# Patient Record
Sex: FEMALE | Race: BLACK OR AFRICAN AMERICAN | NOT HISPANIC OR LATINO | URBAN - METROPOLITAN AREA
[De-identification: names, ages, dates, MRNs, and addresses within clinical notes are randomized per-mention and may not be internally consistent; named-entity substitution may affect disease eponyms.]

---

## 2018-12-26 ENCOUNTER — TELEPHONE (OUTPATIENT)
Dept: HEMATOLOGY/ONCOLOGY | Facility: CLINIC | Age: 23
End: 2018-12-26

## 2018-12-26 NOTE — TELEPHONE ENCOUNTER
"----- Message from Ben Morel RN sent at 12/26/2018 11:11 AM CST -----  Contact: Pt      ----- Message -----  From: Reji Paez  Sent: 12/26/2018  11:02 AM  To: , #    Scheduling an appt    Who called: Pt  Message: Calling in regards to being scheduled for her being diagnosed with "Sickle Cell". Pt stated she hasn't been referred [facility] and wanted to establish care here.   Contact information: [phone]  Additional Information: N/A   "

## 2018-12-27 ENCOUNTER — TELEPHONE (OUTPATIENT)
Dept: HEMATOLOGY/ONCOLOGY | Facility: CLINIC | Age: 23
End: 2018-12-27

## 2018-12-28 NOTE — PROGRESS NOTES
History:       Reason For Consultation:   Sickle cell anemia, transitioning care to adult specialist    Referring Provider:   Stacy Self  No address on file    Records Obtained: Records of the patients history including those obtained from the referring provider were reviewed and summarized in detail.    HPI:   Diamond Uribe presents for consultation of sickle cell anemia. Patient previously followed with Dr Price at [facility] and is transitioning care to adult medicine. She's been well managed on 500 mg Hydrea daily for about 4 years and folic acid. She hasn't had pain crisis. No shortness of air or chest pain, but she's been told before that her heart is enlarged. No vision changes but she hasn't had a recent retinal exam. She is currently engaged; doesn't use form of birth control at this time.       Past Medical   Past Medical History:   Diagnosis Date    Sickle cell anemia      Patient Active Problem List   Diagnosis    Hb-SS disease without crisis    Enlarged heart     Social History   Social History     Socioeconomic History    Marital status: Single     Spouse name: Not on file    Number of children: Not on file    Years of education: Not on file    Highest education level: Not on file   Social Needs    Financial resource strain: Not on file    Food insecurity - worry: Not on file    Food insecurity - inability: Not on file    Transportation needs - medical: Not on file    Transportation needs - non-medical: Not on file   Occupational History    Not on file   Tobacco Use    Smoking status: Never Smoker    Smokeless tobacco: Never Used   Substance and Sexual Activity    Alcohol use: Yes     Frequency: Monthly or less     Drinks per session: 1 or 2    Drug use: Not on file    Sexual activity: Not on file   Other Topics Concern    Not on file   Social History Narrative    Not on file     Family History  Family History   Problem Relation Age of Onset    Sickle cell trait  Mother     Hypothyroidism Mother     Sickle cell trait Father     Diabetes Father     Sickle cell trait Sister     Sickle cell trait Brother      Medications    Current Outpatient Medications:     hydroxyurea (HYDREA) 500 mg Cap, Take 1 capsule (500 mg total) by mouth once daily., Disp: 90 capsule, Rfl: 2    folic acid (FOLVITE) 1 MG tablet, TK 1 T PO QD, Disp: , Rfl: 0  Allergies  Review of patient's allergies indicates: Review of patient's allergies indicates:  Allergies not on file    Review of Systems  Review of Systems   Constitutional: Negative for activity change, appetite change, chills, fatigue, fever and unexpected weight change.   HENT: Negative for congestion, hearing loss, nosebleeds, sinus pressure and trouble swallowing.    Eyes: Negative for pain, discharge and itching.   Respiratory: Negative for cough, chest tightness and shortness of breath.    Cardiovascular: Negative for chest pain, palpitations and leg swelling.   Gastrointestinal: Negative for abdominal distention, abdominal pain, blood in stool, diarrhea, nausea, rectal pain and vomiting.   Endocrine: Negative for heat intolerance and polydipsia.   Genitourinary: Negative for difficulty urinating, dysuria, flank pain, frequency, hematuria and urgency.   Musculoskeletal: Negative for arthralgias, back pain and neck pain.   Skin: Negative for color change, pallor and rash.   Neurological: Negative for dizziness, numbness and headaches.   Hematological: Negative for adenopathy. Does not bruise/bleed easily.   Psychiatric/Behavioral: Negative for confusion and decreased concentration. The patient is not nervous/anxious.         Objective     Objective:     Vitals:    12/31/18 1103   BP: 116/73   Pulse: 84   Resp: 16   Temp: 98.4 °F (36.9 °C)       GENERAL:  Well-developed, well-nourished female in no acute distress. Vital signs reviewed. Accompanied by fiance.   SKIN:  Warm, dry without rashes, purpura or petechiae.  EYES:  Pupils equal,  round and reactive to light.  EOMs intact.  Conjunctivae normal.  HEAD:  Normocephalic.  EARS/NOSE/MOUTH/THROAT:  Hearing intact. Septum midline.  No excoriations or nasal discharge. No stomatitis or ulcers.  Lips normal. Oropharynx without lesions or exudates.  NECK:  Supple with good range of motion; no thyromegaly or masses  LYMPHATICS:  No cervical, supraclavicular, axillary adenopathy.  RESP:  Lungs clear to percussion and auscultation. Good airflow. Normal respiratory effort.   CARDIAC:  Regular rate and rhythm without murmurs, rubs or gallops. Normal S1,S2. No edema  GI:  Soft, nontender, no hepatosplenomegaly, normal bowel sounds  MSK:  No clubbing or cyanosis, No joint swelling noted in hands  NEUROLOGICAL:  Cranial Nerves II-XII grossly intact.  No focal neurological deficits.  PSYCHIATRIC:  Normal affect and mood.  Alert and Oriented x 3.       Labs and Imaging  Results for orders placed or performed in visit on 01/23/17   Throat Screen, Rapid   Result Value Ref Range    Rapid Strep A Screen POSITIVE    CBC auto differential   Result Value Ref Range    WBC 14.4 (H) 4.8 - 10.8 K/uL    RBC 2.01 (L) 4.20 - 5.40 M/uL    Hemoglobin 8.2 (L) 12.0 - 16.0 gm/dL    Hematocrit 23.0 (L) 37.0 - 47.0 %    .1 (H) 80.0 - 100.0 fL    MCH 40.8 (H) 27.0 - 31.0 pg    MCHC 35.7 32.0 - 36.0 gm/dL    RDW 21.2 (H) 11.5 - 14.5 %    Platelets 163 130 - 450 K/uL    MPV 8.3 7.4 - 10.4 fL    Gran% 87.2 (H) 39.0 - 75.0 %    Lymph% 7.7 (L) 24.0 - 44.0 %    Mono% 4.4 0.0 - 10.0 %    Eosinophil% 0.3 0.0 - 12.0 %    Basophil% 0.4 0.0 - 2.0 %    Gran # (ANC) 12.6 (H) 1.5 - 6.6 K/uL    Lymph # 1.1 1.0 - 3.5 K/uL    Mono # 0.6 0.0 - 1.0 K/uL    Eos # 0.0 0.0 - 0.7 K/uL    Baso # 0.1 0.0 - 0.1 K/uL   Comprehensive metabolic panel   Result Value Ref Range    Calcium 8.4 8.4 - 10.2 MG/DL    Glucose 94 74 - 106 MG/DL    BUN, Bld 4 (L) 7.0 - 17.0 MG/DL    Creatinine 0.6 (L) 0.7 - 1.2 MG/DL    eGFR if non African American >60 >60  ml/min/1.73sq.m    eGFR if African American >60 >60 ml/min/1.73sq.m    Total Protein 8.4 (H) 6.3 - 8.2 G/DL    Albumin 4.3 3.5 - 5.0 G/DL    Total Bilirubin 5.7 (H) 0.2 - 1.3 MG/DL    Alkaline Phosphatase 61 38 - 126 U/L    AST 64 (H) 14.0 - 36.0 U/L    ALT 36 9 - 52 U/L    Sodium 139 137 - 145 MMOL/L    Potassium 3.4 (L) 3.5 - 5.1 MMOL/L    Chloride 105 98 - 107 MMOL/L    CO2 23.0 22.0 - 30.0 MMOL/L   Pregnancy, urine rapid   Result Value Ref Range    Preg Test, Ur NEGATIVE    Influenza antigen   Result Value Ref Range    Influenza A NEGATIVE NEG    Influenza B NEGATIVE NEG   Lactic acid, plasma   Result Value Ref Range    Lactate 1.2 0.7 - 2.1 MMOL/L     Labs ordered for today    Assessment   Assessment:     Problem List Items Addressed This Visit        Cardiac/Vascular    Enlarged heart      - Per patient report from her pediatric hematologist.    - Records requested   - Check 2D echo         Relevant Orders    2D Echo Only       Oncology    Hb-SS disease without crisis - Primary      * Well controlled with Hydrea 500 mg daily and folic acid. Continue.    * Annual influenza vaccine - due    * Annual optometry exam - due   * Discussed need for birth control while on Hydrea. Referral to gynecology to discuss             Relevant Medications    hydroxyurea (HYDREA) 500 mg Cap    Other Relevant Orders    Ambulatory consult to Optometry    CBC auto differential    Comprehensive metabolic panel    Hemoglobin Electrophoresis Cascade, Blood    2D Echo Only    CBC auto differential    Comprehensive metabolic panel    Ambulatory referral to Gynecologic Oncology              Plan:     1. Continue Hydrea 500 mg daily and folic acid  2. Referral to gyn and optometry  3. Influenza vaccine  4. Labs today and again in 6 months when she returns.     Follow-up in 6 months. I asked the patient to call for any questions, concerns, or new symptoms.         Distress Screening Results: Psychosocial Distress screening score of Distress  Score: 3 noted and reviewed. No intervention indicated.     ADDENDUM 01/30/2019   Reviewed outside records. Prior hemoglobin electrophoresis showed homozygous sickle cell disease with elevated fetal hemoglobin with 88% hemoglobin S and 8 % hemoglobin F. Appears hemoglobin often in 7- 8 range with retic % in mid-teens.

## 2018-12-31 ENCOUNTER — TELEPHONE (OUTPATIENT)
Dept: HEMATOLOGY/ONCOLOGY | Facility: CLINIC | Age: 23
End: 2018-12-31

## 2018-12-31 ENCOUNTER — LAB VISIT (OUTPATIENT)
Dept: LAB | Facility: HOSPITAL | Age: 23
End: 2018-12-31
Attending: INTERNAL MEDICINE
Payer: COMMERCIAL

## 2018-12-31 ENCOUNTER — INITIAL CONSULT (OUTPATIENT)
Dept: HEMATOLOGY/ONCOLOGY | Facility: CLINIC | Age: 23
End: 2018-12-31
Payer: COMMERCIAL

## 2018-12-31 VITALS
HEIGHT: 65 IN | TEMPERATURE: 98 F | HEART RATE: 84 BPM | RESPIRATION RATE: 16 BRPM | WEIGHT: 148.13 LBS | BODY MASS INDEX: 24.68 KG/M2 | DIASTOLIC BLOOD PRESSURE: 73 MMHG | SYSTOLIC BLOOD PRESSURE: 116 MMHG | OXYGEN SATURATION: 95 %

## 2018-12-31 DIAGNOSIS — D57.1 HB-SS DISEASE WITHOUT CRISIS: Primary | ICD-10-CM

## 2018-12-31 DIAGNOSIS — I51.7 ENLARGED HEART: ICD-10-CM

## 2018-12-31 DIAGNOSIS — D57.1 HB-SS DISEASE WITHOUT CRISIS: ICD-10-CM

## 2018-12-31 LAB
ALBUMIN SERPL BCP-MCNC: 4.1 G/DL
ALP SERPL-CCNC: 60 U/L
ALT SERPL W/O P-5'-P-CCNC: 23 U/L
ANION GAP SERPL CALC-SCNC: 8 MMOL/L
ANISOCYTOSIS BLD QL SMEAR: ABNORMAL
AST SERPL-CCNC: 51 U/L
BASOPHILS # BLD AUTO: 0.03 K/UL
BASOPHILS NFR BLD: 0.3 %
BILIRUB SERPL-MCNC: 3.6 MG/DL
BUN SERPL-MCNC: 6 MG/DL
CALCIUM SERPL-MCNC: 9.2 MG/DL
CHLORIDE SERPL-SCNC: 108 MMOL/L
CO2 SERPL-SCNC: 21 MMOL/L
CREAT SERPL-MCNC: 0.6 MG/DL
DIFFERENTIAL METHOD: ABNORMAL
EOSINOPHIL # BLD AUTO: 0.3 K/UL
EOSINOPHIL NFR BLD: 2.7 %
ERYTHROCYTE [DISTWIDTH] IN BLOOD BY AUTOMATED COUNT: 21.8 %
EST. GFR  (AFRICAN AMERICAN): >60 ML/MIN/1.73 M^2
EST. GFR  (NON AFRICAN AMERICAN): >60 ML/MIN/1.73 M^2
GLUCOSE SERPL-MCNC: 82 MG/DL
HCT VFR BLD AUTO: 20.9 %
HGB BLD-MCNC: 7.5 G/DL
HYPOCHROMIA BLD QL SMEAR: ABNORMAL
IMM GRANULOCYTES # BLD AUTO: 0.04 K/UL
IMM GRANULOCYTES NFR BLD AUTO: 0.4 %
LYMPHOCYTES # BLD AUTO: 3.5 K/UL
LYMPHOCYTES NFR BLD: 37.8 %
MCH RBC QN AUTO: 32.9 PG
MCHC RBC AUTO-ENTMCNC: 35.9 G/DL
MCV RBC AUTO: 92 FL
MONOCYTES # BLD AUTO: 1.3 K/UL
MONOCYTES NFR BLD: 14.3 %
NEUTROPHILS # BLD AUTO: 4.1 K/UL
NEUTROPHILS NFR BLD: 44.5 %
NRBC BLD-RTO: 2 /100 WBC
OVALOCYTES BLD QL SMEAR: ABNORMAL
PLATELET # BLD AUTO: 458 K/UL
PMV BLD AUTO: 9.6 FL
POIKILOCYTOSIS BLD QL SMEAR: ABNORMAL
POLYCHROMASIA BLD QL SMEAR: ABNORMAL
POTASSIUM SERPL-SCNC: 4.5 MMOL/L
PROT SERPL-MCNC: 8 G/DL
RBC # BLD AUTO: 2.28 M/UL
SICKLE CELLS BLD QL SMEAR: ABNORMAL
SODIUM SERPL-SCNC: 137 MMOL/L
TARGETS BLD QL SMEAR: ABNORMAL
WBC # BLD AUTO: 9.19 K/UL

## 2018-12-31 PROCEDURE — 99999 PR PBB SHADOW E&M-EST. PATIENT-LVL IV: CPT | Mod: PBBFAC,,, | Performed by: INTERNAL MEDICINE

## 2018-12-31 PROCEDURE — 85660 RBC SICKLE CELL TEST: CPT

## 2018-12-31 PROCEDURE — 83020 HEMOGLOBIN ELECTROPHORESIS: CPT

## 2018-12-31 PROCEDURE — 99204 OFFICE O/P NEW MOD 45 MIN: CPT | Mod: S$GLB,,, | Performed by: INTERNAL MEDICINE

## 2018-12-31 PROCEDURE — 3008F BODY MASS INDEX DOCD: CPT | Mod: CPTII,S$GLB,, | Performed by: INTERNAL MEDICINE

## 2018-12-31 PROCEDURE — 80053 COMPREHEN METABOLIC PANEL: CPT

## 2018-12-31 PROCEDURE — 85025 COMPLETE CBC W/AUTO DIFF WBC: CPT

## 2018-12-31 RX ORDER — HYDROXYUREA 500 MG/1
500 CAPSULE ORAL DAILY
COMMUNITY
End: 2018-12-31 | Stop reason: SDUPTHER

## 2018-12-31 RX ORDER — HYDROXYUREA 500 MG/1
500 CAPSULE ORAL DAILY
Qty: 90 CAPSULE | Refills: 2 | Status: SHIPPED | OUTPATIENT
Start: 2018-12-31 | End: 2019-05-20 | Stop reason: SDUPTHER

## 2018-12-31 RX ORDER — FOLIC ACID 1 MG/1
TABLET ORAL
Refills: 0 | COMMUNITY
Start: 2018-10-20 | End: 2020-07-06 | Stop reason: SDUPTHER

## 2018-12-31 NOTE — ASSESSMENT & PLAN NOTE
* Well controlled with Hydrea 500 mg daily and folic acid. Continue.    * Annual influenza vaccine - due    * Annual optometry exam - due   * Discussed need for birth control while on Hydrea. Referral to gynecology to discuss

## 2018-12-31 NOTE — TELEPHONE ENCOUNTER
----- Message from Roro Ceja sent at 12/31/2018 12:42 PM CST -----  Done    ----- Message -----  From: Devika Amador  Sent: 12/31/2018  11:55 AM  To: Roro Ceja    Please put in the new echo order.     Message Received: Today   Message Contents MD Devika Angeles    Referral to optometry and gynecology; labs today; echo in next 2 weeks   RTC in 6 mo with cbc, cmp

## 2018-12-31 NOTE — PATIENT INSTRUCTIONS
Optometry referral for retinal exam  Echo to evaluate heart  Labs today  Continue Hydrea and folic acid  Use some form of birth control while on Hydrea.

## 2018-12-31 NOTE — TELEPHONE ENCOUNTER
Called patient to notify of results. She is anemic but fairly asymptoamtic and not far off from prior values. Will monitor. Had to leave a message and asked patient to call back with questions.

## 2019-01-02 LAB — HGB S BLD QL: POSITIVE

## 2019-01-03 LAB
HGB A MFR BLD ELPH: 0 % (ref 95.8–98)
HGB A2 MFR BLD: 3.1 % (ref 2–3.3)
HGB F MFR BLD: 9.7 % (ref 0–0.9)
HGB FRACT BLD ELPH-IMP: ABNORMAL
HGB OTHER MFR BLD ELPH: ABNORMAL %
THEVP VARIANT 2: ABNORMAL
THEVP VARIANT 3: ABNORMAL

## 2019-01-04 ENCOUNTER — TELEPHONE (OUTPATIENT)
Dept: HEMATOLOGY/ONCOLOGY | Facility: CLINIC | Age: 24
End: 2019-01-04

## 2019-01-04 NOTE — TELEPHONE ENCOUNTER
----- Message from Brooke Valiente MD sent at 12/31/2018 11:30 AM CST -----  Can we also gets records from Dr Price/[facility]

## 2019-03-08 ENCOUNTER — TELEPHONE (OUTPATIENT)
Dept: HEMATOLOGY/ONCOLOGY | Facility: CLINIC | Age: 24
End: 2019-03-08

## 2019-03-08 NOTE — TELEPHONE ENCOUNTER
Returned call left message to call the office. Number was provided.        ----- Message from Abi Armstrong sent at 3/8/2019 12:38 PM CST -----  Contact: Pt   Pt called to schedule appt   Callback#[phone]  Thank You  MICHAELA Armstrong

## 2019-03-15 ENCOUNTER — TELEPHONE (OUTPATIENT)
Dept: HEMATOLOGY/ONCOLOGY | Facility: CLINIC | Age: 24
End: 2019-03-15

## 2019-03-15 NOTE — TELEPHONE ENCOUNTER
Returned call to pt.   Assisted pt with scheduling June recall.   Pt verbalized understanding.         ----- Message from Prachi Jay sent at 3/15/2019  8:57 AM CDT -----  Contact: Self  Pt is calling to speak with Staff regarding schedule an appt for a check up.    She can be reached at [phone].    Thank you.

## 2019-04-16 ENCOUNTER — TELEPHONE (OUTPATIENT)
Dept: HEMATOLOGY/ONCOLOGY | Facility: CLINIC | Age: 24
End: 2019-04-16

## 2019-04-16 NOTE — TELEPHONE ENCOUNTER
Returned call patient wanted to reschedule appointment to May 10. Mailed new appt slips.      ----- Message from Abi Armstrogn sent at 4/16/2019 10:51 AM CDT -----  Contact: pt   Pt called to speak with you about moving up her Josie 3 appt to something sooner   Callback#[phone]  Thank You  MICHAELA Armstrong

## 2019-05-09 ENCOUNTER — TELEPHONE (OUTPATIENT)
Dept: HEMATOLOGY/ONCOLOGY | Facility: CLINIC | Age: 24
End: 2019-05-09

## 2019-05-09 NOTE — TELEPHONE ENCOUNTER
----- Message from Devika Amador sent at 5/9/2019 10:35 AM CDT -----  Contact: pt       ----- Message -----  From: Ann Marie Hughes  Sent: 5/9/2019   8:09 AM  To: Linda Wilkerson    Pt will need to cancel and reschedule the 5/10 appt to 5/20 instead same time in the morning if possible. She doesn't have transportation Please call her and confirm:   [phone]  Thank you

## 2019-05-17 NOTE — PROGRESS NOTES
History:     Reason for follow up:   Sickle cell anemia    HPI:   Diamond Uribe presents for follow up of sickle cell anemia. Patient previously followed with [facility] at [facility] and transitioned care to adult medicine in 2019. She's been well managed on 500 mg Hydrea daily and folic acid. She hasn't had pain crisis. She's doing well. No acute change. UTD on vaccines. Compliant with medications.    History: I reviewed, confirmed and updated history (past medical, social, family) as necessary.    Medications    Current Outpatient Medications:     folic acid (FOLVITE) 1 MG tablet, Take one tablet(1mg) by mouth daily, Disp: , Rfl: 0    hydroxyurea (HYDREA) 500 mg Cap, Take 1 capsule (500 mg total) by mouth once daily., Disp: 90 capsule, Rfl: 2    metoclopramide HCl (REGLAN) 10 MG tablet, Take 1 tablet (10 mg total) by mouth every 6 (six) hours as needed., Disp: 30 tablet, Rfl: 0  Allergies  Review of patient's allergies indicates: Review of patient's allergies indicates:  Allergies not on file    Review of Systems  Review of Systems   Constitutional: Negative for activity change, appetite change, chills, fatigue, fever and unexpected weight change.   HENT: Negative for congestion, hearing loss, nosebleeds, sinus pressure and trouble swallowing.    Eyes: Negative for pain, discharge and itching.   Respiratory: Negative for cough, chest tightness and shortness of breath.    Cardiovascular: Negative for chest pain, palpitations and leg swelling.   Gastrointestinal: Negative for abdominal distention, abdominal pain, blood in stool, diarrhea, nausea, rectal pain and vomiting.   Endocrine: Negative for heat intolerance and polydipsia.   Genitourinary: Negative for difficulty urinating, dysuria, flank pain, frequency, hematuria and urgency.   Musculoskeletal: Negative for arthralgias, back pain and neck pain.   Skin: Negative for color change, pallor and rash.   Neurological: Negative for dizziness,  numbness and headaches.   Hematological: Negative for adenopathy. Does not bruise/bleed easily.   Psychiatric/Behavioral: Negative for confusion and decreased concentration. The patient is not nervous/anxious.         Objective     Objective:     Vitals:    05/20/19 0811   BP: 109/75   Pulse: 88   Resp: 20   Temp: 98.7 °F (37.1 °C)     GENERAL: female comfortable, no acute distress  SKIN:  Warm, without rashes, purpura or petechiae.   EYES:  EOMs intact.  Conjunctivae normal. Pupils equal and reactive to light.   EARS:  Hearing intact.  LYMPHATICS:  No cervical, supraclavicular, axillary adenopathy.  RESP:  Lungs clear to percussion and auscultation. Good airflow. Normal effort.   CARDIAC:  Regular rate and rhythm without murmurs, rubs or gallops. Normal S1,S2. Lower extremity edema: No  GI:  Soft, nontender, normal bowel sounds, no hepatosplenomegaly  PSYCHIATRIC:  Normal affect and mood; alert and oriented x 3; Insight and judgement appropriate    Labs and Imaging  Results for orders placed or performed during the hospital encounter of 03/13/19   Lactoferrin, fecal, quantitative   Result Value Ref Range    Lactoferrin, stool Negative    Occult blood x 1, stool   Result Value Ref Range    Occult Blood Negative Negative     Labs ordered for today    Assessment   Assessment/Plan:        Problem List Items Addressed This Visit        Oncology    Hb-SS disease without crisis    Overview     Prior hemoglobin electrophoresis showed homozygous sickle cell disease with elevated fetal hemoglobin with 88% hemoglobin S and 8 % hemoglobin F. Appears hemoglobin often in 7- 8 range with retic % in mid-teens.   Well maintained on Hydea 500 mg daily and folic acid.   Does not have frequent pain crisis.          Current Assessment & Plan     Hgb stable  Continue Hydrea and aspirin  Optometry referral for annual eye exam  Knows to use contraception while on Hydrea  Plan to order echo at next visit            Relevant Medications     hydroxyurea (HYDREA) 500 mg Cap    Other Relevant Orders    Ambulatory Referral to Optometry        Follow-up in 6 months. I asked the patient to call for any questions, concerns, or new symptoms.    Distress Screening Results: Psychosocial Distress screening score of Distress Score: 0 noted and reviewed. No intervention indicated.

## 2019-05-20 ENCOUNTER — OFFICE VISIT (OUTPATIENT)
Dept: HEMATOLOGY/ONCOLOGY | Facility: CLINIC | Age: 24
End: 2019-05-20
Payer: COMMERCIAL

## 2019-05-20 ENCOUNTER — LAB VISIT (OUTPATIENT)
Dept: LAB | Facility: HOSPITAL | Age: 24
End: 2019-05-20
Attending: INTERNAL MEDICINE
Payer: MEDICARE

## 2019-05-20 VITALS
DIASTOLIC BLOOD PRESSURE: 75 MMHG | WEIGHT: 147.5 LBS | HEIGHT: 65 IN | RESPIRATION RATE: 20 BRPM | TEMPERATURE: 99 F | SYSTOLIC BLOOD PRESSURE: 109 MMHG | OXYGEN SATURATION: 97 % | HEART RATE: 88 BPM | BODY MASS INDEX: 24.57 KG/M2

## 2019-05-20 DIAGNOSIS — D57.1 HB-SS DISEASE WITHOUT CRISIS: ICD-10-CM

## 2019-05-20 LAB
ALBUMIN SERPL BCP-MCNC: 4.1 G/DL (ref 3.5–5.2)
ALP SERPL-CCNC: 65 U/L (ref 55–135)
ALT SERPL W/O P-5'-P-CCNC: 35 U/L (ref 10–44)
ANION GAP SERPL CALC-SCNC: 7 MMOL/L (ref 8–16)
ANISOCYTOSIS BLD QL SMEAR: ABNORMAL
AST SERPL-CCNC: 65 U/L (ref 10–40)
BASOPHILS # BLD AUTO: 0.05 K/UL (ref 0–0.2)
BASOPHILS NFR BLD: 0.4 % (ref 0–1.9)
BILIRUB SERPL-MCNC: 3.7 MG/DL (ref 0.1–1)
BUN SERPL-MCNC: 6 MG/DL (ref 6–20)
CALCIUM SERPL-MCNC: 9.2 MG/DL (ref 8.7–10.5)
CHLORIDE SERPL-SCNC: 109 MMOL/L (ref 95–110)
CO2 SERPL-SCNC: 23 MMOL/L (ref 23–29)
CREAT SERPL-MCNC: 0.6 MG/DL (ref 0.5–1.4)
DIFFERENTIAL METHOD: ABNORMAL
EOSINOPHIL # BLD AUTO: 0.2 K/UL (ref 0–0.5)
EOSINOPHIL NFR BLD: 2.1 % (ref 0–8)
ERYTHROCYTE [DISTWIDTH] IN BLOOD BY AUTOMATED COUNT: 23 % (ref 11.5–14.5)
EST. GFR  (AFRICAN AMERICAN): >60 ML/MIN/1.73 M^2
EST. GFR  (NON AFRICAN AMERICAN): >60 ML/MIN/1.73 M^2
GLUCOSE SERPL-MCNC: 85 MG/DL (ref 70–110)
HCT VFR BLD AUTO: 21 % (ref 37–48.5)
HGB BLD-MCNC: 7.5 G/DL (ref 12–16)
HYPOCHROMIA BLD QL SMEAR: ABNORMAL
IMM GRANULOCYTES # BLD AUTO: 0.05 K/UL (ref 0–0.04)
IMM GRANULOCYTES NFR BLD AUTO: 0.4 % (ref 0–0.5)
LYMPHOCYTES # BLD AUTO: 3.1 K/UL (ref 1–4.8)
LYMPHOCYTES NFR BLD: 27.5 % (ref 18–48)
MCH RBC QN AUTO: 31.1 PG (ref 27–31)
MCHC RBC AUTO-ENTMCNC: 35.7 G/DL (ref 32–36)
MCV RBC AUTO: 87 FL (ref 82–98)
MONOCYTES # BLD AUTO: 1.2 K/UL (ref 0.3–1)
MONOCYTES NFR BLD: 11 % (ref 4–15)
NEUTROPHILS # BLD AUTO: 6.5 K/UL (ref 1.8–7.7)
NEUTROPHILS NFR BLD: 58.6 % (ref 38–73)
NRBC BLD-RTO: 1 /100 WBC
OVALOCYTES BLD QL SMEAR: ABNORMAL
PLATELET # BLD AUTO: 374 K/UL (ref 150–350)
PMV BLD AUTO: 9.6 FL (ref 9.2–12.9)
POIKILOCYTOSIS BLD QL SMEAR: ABNORMAL
POLYCHROMASIA BLD QL SMEAR: ABNORMAL
POTASSIUM SERPL-SCNC: 4.3 MMOL/L (ref 3.5–5.1)
PROT SERPL-MCNC: 8 G/DL (ref 6–8.4)
RBC # BLD AUTO: 2.41 M/UL (ref 4–5.4)
SICKLE CELLS BLD QL SMEAR: ABNORMAL
SODIUM SERPL-SCNC: 139 MMOL/L (ref 136–145)
TARGETS BLD QL SMEAR: ABNORMAL
WBC # BLD AUTO: 11.16 K/UL (ref 3.9–12.7)

## 2019-05-20 PROCEDURE — 99213 OFFICE O/P EST LOW 20 MIN: CPT | Mod: S$GLB,,, | Performed by: INTERNAL MEDICINE

## 2019-05-20 PROCEDURE — 3008F BODY MASS INDEX DOCD: CPT | Mod: CPTII,S$GLB,, | Performed by: INTERNAL MEDICINE

## 2019-05-20 PROCEDURE — 3008F PR BODY MASS INDEX (BMI) DOCUMENTED: ICD-10-PCS | Mod: CPTII,S$GLB,, | Performed by: INTERNAL MEDICINE

## 2019-05-20 PROCEDURE — 85025 COMPLETE CBC W/AUTO DIFF WBC: CPT

## 2019-05-20 PROCEDURE — 99213 PR OFFICE/OUTPT VISIT, EST, LEVL III, 20-29 MIN: ICD-10-PCS | Mod: S$GLB,,, | Performed by: INTERNAL MEDICINE

## 2019-05-20 PROCEDURE — 80053 COMPREHEN METABOLIC PANEL: CPT

## 2019-05-20 PROCEDURE — 99999 PR PBB SHADOW E&M-EST. PATIENT-LVL III: CPT | Mod: PBBFAC,,, | Performed by: INTERNAL MEDICINE

## 2019-05-20 PROCEDURE — 36415 COLL VENOUS BLD VENIPUNCTURE: CPT

## 2019-05-20 PROCEDURE — 99999 PR PBB SHADOW E&M-EST. PATIENT-LVL III: ICD-10-PCS | Mod: PBBFAC,,, | Performed by: INTERNAL MEDICINE

## 2019-05-20 RX ORDER — HYDROXYUREA 500 MG/1
500 CAPSULE ORAL DAILY
Qty: 90 CAPSULE | Refills: 2 | Status: SHIPPED | OUTPATIENT
Start: 2019-05-20 | End: 2019-11-21 | Stop reason: SDUPTHER

## 2019-05-20 NOTE — ASSESSMENT & PLAN NOTE
Hgb stable  Continue Hydrea and aspirin  Optometry referral for annual eye exam  Knows to use contraception while on Hydrea  Plan to order echo at next visit

## 2019-08-30 ENCOUNTER — OFFICE VISIT (OUTPATIENT)
Dept: URGENT CARE | Facility: CLINIC | Age: 24
End: 2019-08-30
Payer: COMMERCIAL

## 2019-08-30 VITALS
TEMPERATURE: 98 F | DIASTOLIC BLOOD PRESSURE: 68 MMHG | WEIGHT: 147 LBS | BODY MASS INDEX: 24.49 KG/M2 | SYSTOLIC BLOOD PRESSURE: 121 MMHG | HEART RATE: 98 BPM | HEIGHT: 65 IN | OXYGEN SATURATION: 95 %

## 2019-08-30 DIAGNOSIS — J30.9 ALLERGIC RHINITIS, UNSPECIFIED SEASONALITY, UNSPECIFIED TRIGGER: Primary | ICD-10-CM

## 2019-08-30 PROCEDURE — 99203 PR OFFICE/OUTPT VISIT, NEW, LEVL III, 30-44 MIN: ICD-10-PCS | Mod: S$GLB,,, | Performed by: PHYSICIAN ASSISTANT

## 2019-08-30 PROCEDURE — 99203 OFFICE O/P NEW LOW 30 MIN: CPT | Mod: S$GLB,,, | Performed by: PHYSICIAN ASSISTANT

## 2019-08-30 RX ORDER — FLUTICASONE PROPIONATE 50 MCG
1 SPRAY, SUSPENSION (ML) NASAL DAILY
Qty: 16 G | Refills: 0 | Status: SHIPPED | OUTPATIENT
Start: 2019-08-30 | End: 2019-11-14

## 2019-08-30 RX ORDER — AZELASTINE 1 MG/ML
1 SPRAY, METERED NASAL 2 TIMES DAILY
Qty: 30 ML | Refills: 0 | Status: SHIPPED | OUTPATIENT
Start: 2019-08-30 | End: 2019-11-14

## 2019-08-30 RX ORDER — BROMPHENIRAMINE MALEATE, PSEUDOEPHEDRINE HYDROCHLORIDE, AND DEXTROMETHORPHAN HYDROBROMIDE 2; 30; 10 MG/5ML; MG/5ML; MG/5ML
10 SYRUP ORAL EVERY 6 HOURS PRN
Qty: 180 ML | Refills: 0 | Status: SHIPPED | OUTPATIENT
Start: 2019-08-30 | End: 2019-11-14

## 2019-08-30 RX ORDER — BETAMETHASONE SODIUM PHOSPHATE AND BETAMETHASONE ACETATE 3; 3 MG/ML; MG/ML
6 INJECTION, SUSPENSION INTRA-ARTICULAR; INTRALESIONAL; INTRAMUSCULAR; SOFT TISSUE
Status: SHIPPED | OUTPATIENT
Start: 2019-08-30

## 2019-08-30 NOTE — PATIENT INSTRUCTIONS
· Follow up with your primary care if symptoms do not improve, or you may return here at any time.  · If you were referred to a specialist, please follow up with that specialty.  · If you were prescribed antibiotics, please take them to completion.  · If you were prescribed a narcotic or any medication with sedative effects, do not drive or operate heavy equipment or machinery while taking these medications.  · You must understand that you have received treatment at an Urgent Care facility only, and that you may be released before all of your medical problems are known or treated. Urgent Care facilities are not equipped to handle life threatening emergencies. It is recommended that you seek care at an Emergency Department for further evaluation of worsening or concerning symptoms, or possibly life threatening conditions as discussed.                                        If you  smoke, please stop smoking      Over the counter and home treatment of symptoms:  Alternate tylenol and motrin every 3 hours  Salt water gargles  Cold-eeze helps to reduce the duration of sore throat symptoms  Cepachol helps to numb the discomfort  Chloroseptic spray  Nasal saline spray reduces inflammation and dryness  Warm face compresses as often as you can  Vicks vapor rub at night  Flonase OTC or Nasacort OTC  Simple foods like chicken noodle soup help  Pedialyte helps with dehydration if lacking appetite  Rest as much as you can        Allergic Rhinitis  Allergic rhinitis is an allergic reaction that affects the nose, and often the eyes. Its often known as nasal allergies. Nasal allergies are often due to things in the environment that are breathed in. Depending what you are sensitive to, nasal allergies may occur only during certain seasons. Or they may occur year round. Common indoor allergens include house dust mites, mold, cockroaches, and pet dander. Outdoor allergens include pollen from trees, grasses, and weeds.   Symptoms  include a drippy, stuffy, and itchy nose. They also include sneezing and red and itchy eyes. You may feel tired more often. Severe allergies may also affect your breathing and trigger a condition called asthma.   Tests can be done to see what allergens are affecting you. You may be referred to an allergy specialist for testing and further evaluation.  Home care  Your healthcare provider may prescribe medicines to help relieve allergy symptoms. These may include oral medicines, nasal sprays, or eye drops.  Ask your provider for advice on how to avoid substances that you are allergic to. Below are a few tips for each type of allergen.  Pet dander:  · Do not have pets with fur and feathers.  · If you can't avoid having a pet, keep it out of your bedroom and off upholstered furniture.  Pollen:  · When pollen counts are high, keep windows of your car and home closed. If possible, use an air conditioner instead.  · Wear a filter mask when mowing or doing yard work.  House dust mites:  · Wash bedding every week in warm water and detergent and dry on a hot setting.  · Cover the mattress, box spring, and pillows with allergy covers.   · If possible, sleep in a room with no carpet, curtains, or upholstered furniture.  Cockroaches:  · Store food in sealed containers.  · Remove garbage from the home promptly.  · Fix water leaks  Mold:  · Keep humidity low by using a dehumidifier or air conditioner. Keep the dehumidifier and air conditioner clean and free of mold.  · Clean moldy areas with bleach and water.  In general:  · Vacuum once or twice a week. If possible, use a vacuum with a high-efficiency particulate air (HEPA) filter.  · Do not smoke. Avoid cigarette smoke. Cigarette smoke is an irritant that can make symptoms worse.  Follow-up care  Follow up as advised by the healthcare provider or our staff. If you were referred to an allergy specialist, make this appointment promptly.  When to seek medical advice  Call your  healthcare provider right away if the following occur:  · Coughing or wheezing  · Fever greater than 100.4°F (38°C)  · Hives (raised red bumps)  · Continuing symptoms, new symptoms, or worsening symptoms  Call 911 right away if you have:  · Trouble breathing  · Severe swelling of the face or severe itching of the eyes or mouth  Date Last Reviewed: 3/1/2017  © 0395-3610 [employer]. [address] [place] [address], [place] [address]. All rights reserved. This information is not intended as a substitute for professional medical care. Always follow your healthcare professional's instructions.

## 2019-08-30 NOTE — LETTER
August 30, 2019      [facility]  [address], [address]  [place] [address]  Phone: [phone]  Fax: [phone]       Patient: Diamond Uribe   YOB: 1995  Date of Visit: 08/30/2019    To Whom It May Concern:    Richelle Uribe  was at [facility] on 08/30/2019. She may return to work/school on 8/31/2019 with no restrictions. If you have any questions or concerns, or if I can be of further assistance, please do not hesitate to contact me.    Sincerely,    Gregg Yousif PA-C

## 2019-08-30 NOTE — PROGRESS NOTES
"Subjective:       Patient ID: Diamond Uribe is a 23 y.o. female.    Vitals:  height is 5' 5" (1.651 m) and weight is 66.7 kg (147 lb). Her oral temperature is 98 °F (36.7 °C). Her blood pressure is 121/68 and her pulse is 98. Her oxygen saturation is 95%.     Chief Complaint: Sinus Problem    Sinus Problem   This is a new problem. Episode onset: 2 weeks ago. The problem has been gradually worsening since onset. There has been no fever. Associated symptoms include chills, congestion, headaches and sneezing. Pertinent negatives include no coughing, diaphoresis, ear pain, neck pain, sinus pressure or sore throat. Treatments tried: motrin, tylenol, NyQuil. The treatment provided mild relief.       Constitution: Positive for chills. Negative for sweating, fatigue and fever.   HENT: Positive for congestion. Negative for ear pain, postnasal drip, sinus pain, sinus pressure, sore throat and voice change.    Neck: Negative for neck pain.   Eyes: Negative for eye redness.   Respiratory: Negative for chest tightness, cough and sputum production.    Gastrointestinal: Negative for nausea, vomiting and diarrhea.   Musculoskeletal: Negative for muscle ache.   Allergic/Immunologic: Positive for sneezing. Negative for seasonal allergies.   Neurological: Positive for headaches.       Objective:      Physical Exam   Constitutional: She is oriented to person, place, and time. She appears well-developed and well-nourished. She is cooperative.  Non-toxic appearance. She does not have a sickly appearance. She does not appear ill. No distress.   HENT:   Head: Normocephalic and atraumatic.   Right Ear: Hearing, tympanic membrane, external ear and ear canal normal.   Left Ear: Hearing, tympanic membrane, external ear and ear canal normal.   Nose: Mucosal edema present. No rhinorrhea or nasal deformity. No epistaxis. Right sinus exhibits no maxillary sinus tenderness and no frontal sinus tenderness. Left sinus exhibits no maxillary " sinus tenderness and no frontal sinus tenderness.   Mouth/Throat: Uvula is midline, oropharynx is clear and moist and mucous membranes are normal. No trismus in the jaw. Normal dentition. No uvula swelling. No oropharyngeal exudate, posterior oropharyngeal edema or posterior oropharyngeal erythema.       Eyes: Pupils are equal, round, and reactive to light. Conjunctivae, EOM and lids are normal. No scleral icterus.   Sclera clear bilat   Neck: Trachea normal, full passive range of motion without pain and phonation normal. Neck supple. No neck rigidity. No edema and no erythema present.   Cardiovascular: Normal rate, regular rhythm, normal heart sounds, intact distal pulses and normal pulses.   Pulmonary/Chest: Effort normal and breath sounds normal. No respiratory distress. She has no decreased breath sounds. She has no wheezes. She has no rhonchi. She has no rales.   Abdominal: Normal appearance.   Musculoskeletal: Normal range of motion. She exhibits no edema or deformity.   Lymphadenopathy:     She has no cervical adenopathy.   Neurological: She is alert and oriented to person, place, and time. She exhibits normal muscle tone. Coordination normal.   Skin: Skin is warm, dry and intact. She is not diaphoretic. No pallor.   Psychiatric: She has a normal mood and affect. Her speech is normal and behavior is normal. Judgment and thought content normal. Cognition and memory are normal.   Nursing note and vitals reviewed.      Assessment:       1. Allergic rhinitis, unspecified seasonality, unspecified trigger        Plan:     IM steroid not administered as patient decided against.    Allergic rhinitis, unspecified seasonality, unspecified trigger  -     azelastine (ASTELIN) 137 mcg (0.1 %) nasal spray; 1 spray (137 mcg total) by Nasal route 2 (two) times daily.  Dispense: 30 mL; Refill: 0  -     fluticasone propionate (FLONASE) 50 mcg/actuation nasal spray; 1 spray (50 mcg total) by Each Nostril route once daily.   Dispense: 16 g; Refill: 0  -     brompheniramine-pseudoeph-DM (BROMFED DM) 2-30-10 mg/5 mL Syrp; Take 10 mLs by mouth every 6 (six) hours as needed.  Dispense: 180 mL; Refill: 0  -     betamethasone acetate-betamethasone sodium phosphate injection 6 mg      Patient Instructions   · Follow up with your primary care if symptoms do not improve, or you may return here at any time.  · If you were referred to a specialist, please follow up with that specialty.  · If you were prescribed antibiotics, please take them to completion.  · If you were prescribed a narcotic or any medication with sedative effects, do not drive or operate heavy equipment or machinery while taking these medications.  · You must understand that you have received treatment at an Urgent Care facility only, and that you may be released before all of your medical problems are known or treated. Urgent Care facilities are not equipped to handle life threatening emergencies. It is recommended that you seek care at an Emergency Department for further evaluation of worsening or concerning symptoms, or possibly life threatening conditions as discussed.                                        If you  smoke, please stop smoking      Over the counter and home treatment of symptoms:  Alternate tylenol and motrin every 3 hours  Salt water gargles  Cold-eeze helps to reduce the duration of sore throat symptoms  Cepachol helps to numb the discomfort  Chloroseptic spray  Nasal saline spray reduces inflammation and dryness  Warm face compresses as often as you can  Vicks vapor rub at night  Flonase OTC or Nasacort OTC  Simple foods like chicken noodle soup help  Pedialyte helps with dehydration if lacking appetite  Rest as much as you can        Allergic Rhinitis  Allergic rhinitis is an allergic reaction that affects the nose, and often the eyes. Its often known as nasal allergies. Nasal allergies are often due to things in the environment that are breathed in.  Depending what you are sensitive to, nasal allergies may occur only during certain seasons. Or they may occur year round. Common indoor allergens include house dust mites, mold, cockroaches, and pet dander. Outdoor allergens include pollen from trees, grasses, and weeds.   Symptoms include a drippy, stuffy, and itchy nose. They also include sneezing and red and itchy eyes. You may feel tired more often. Severe allergies may also affect your breathing and trigger a condition called asthma.   Tests can be done to see what allergens are affecting you. You may be referred to an allergy specialist for testing and further evaluation.  Home care  Your healthcare provider may prescribe medicines to help relieve allergy symptoms. These may include oral medicines, nasal sprays, or eye drops.  Ask your provider for advice on how to avoid substances that you are allergic to. Below are a few tips for each type of allergen.  Pet dander:  · Do not have pets with fur and feathers.  · If you can't avoid having a pet, keep it out of your bedroom and off upholstered furniture.  Pollen:  · When pollen counts are high, keep windows of your car and home closed. If possible, use an air conditioner instead.  · Wear a filter mask when mowing or doing yard work.  House dust mites:  · Wash bedding every week in warm water and detergent and dry on a hot setting.  · Cover the mattress, box spring, and pillows with allergy covers.   · If possible, sleep in a room with no carpet, curtains, or upholstered furniture.  Cockroaches:  · Store food in sealed containers.  · Remove garbage from the home promptly.  · Fix water leaks  Mold:  · Keep humidity low by using a dehumidifier or air conditioner. Keep the dehumidifier and air conditioner clean and free of mold.  · Clean moldy areas with bleach and water.  In general:  · Vacuum once or twice a week. If possible, use a vacuum with a high-efficiency particulate air (HEPA) filter.  · Do not smoke.  Avoid cigarette smoke. Cigarette smoke is an irritant that can make symptoms worse.  Follow-up care  Follow up as advised by the healthcare provider or our staff. If you were referred to an allergy specialist, make this appointment promptly.  When to seek medical advice  Call your healthcare provider right away if the following occur:  · Coughing or wheezing  · Fever greater than 100.4°F (38°C)  · Hives (raised red bumps)  · Continuing symptoms, new symptoms, or worsening symptoms  Call 911 right away if you have:  · Trouble breathing  · Severe swelling of the face or severe itching of the eyes or mouth  Date Last Reviewed: 3/1/2017  © 9268-9467 [employer] [address] [place] [address], [place] [address]. All rights reserved. This information is not intended as a substitute for professional medical care. Always follow your healthcare professional's instructions.

## 2019-11-14 PROBLEM — A41.9 SEPSIS: Status: ACTIVE | Noted: 2019-11-14

## 2019-11-15 PROBLEM — R50.9 FEVER: Status: ACTIVE | Noted: 2019-11-15

## 2019-11-16 PROBLEM — A41.9 SEPSIS: Status: RESOLVED | Noted: 2019-11-14 | Resolved: 2019-11-16

## 2019-11-16 PROBLEM — R50.9 FEVER: Status: RESOLVED | Noted: 2019-11-15 | Resolved: 2019-11-16

## 2019-11-16 PROBLEM — N39.0 UTI (URINARY TRACT INFECTION): Status: ACTIVE | Noted: 2019-11-16

## 2019-11-20 ENCOUNTER — TELEPHONE (OUTPATIENT)
Dept: HEMATOLOGY/ONCOLOGY | Facility: CLINIC | Age: 24
End: 2019-11-20

## 2019-11-20 NOTE — PROGRESS NOTES
History:     Reason for follow up:   Sickle cell anemia    HPI:   Diamond Uribe presents for follow up of sickle cell anemia. [facility] and transitioned care to adult medicine in 2019. She's been well managed on 500 mg Hydrea daily and folic acid. She was in the hospital earlier this month with vaso-occlusive crisis and sepsis secondary to UTI and upper respiratory virus. CT angio chest negative for PE. Quickly improved, didn't require prbc transfusion. .Patient continues to have a cough that is not relieved by over-the-counter cough syrup.  He does keep her awake at night.  Mom patient presents accompanied by her mother today he was multiple questions.  Patient's mother notes that patient's hydrea dose has been incorrect. Patient's pain improving.     History: I reviewed, confirmed and updated history (past medical, social, family) as necessary.    Medications    Current Outpatient Medications:     cholecalciferol, vitamin D3, (VITAMIN D3) 2,000 unit Tab, Take 1 each by mouth., Disp: , Rfl:     ciprofloxacin HCl (CIPRO) 500 MG tablet, Take 1 tablet (500 mg total) by mouth every 12 (twelve) hours. for 6 days, Disp: 12 tablet, Rfl: 0    folic acid (FOLVITE) 1 MG tablet, Take one tablet(1mg) by mouth daily, Disp: , Rfl: 0    hydroxyurea (HYDREA) 500 mg Cap, Take 2 capsules (1,000 mg total) by mouth once daily M-F with 3 capsules Sat/Sun., Disp: 210 capsule, Rfl: 3    hydrocodone-chlorpheniramine (TUSSIONEX) 10-8 mg/5 mL suspension, Take 5 mLs by mouth every 12 (twelve) hours as needed for Cough., Disp: 70 mL, Rfl: 0    Current Facility-Administered Medications:     betamethasone acetate-betamethasone sodium phosphate injection 6 mg, 6 mg, Intramuscular, 1 time in Clinic/HOD, Gregg Yousif PA-C  Allergies  Review of patient's allergies indicates: Review of patient's allergies indicates:  Allergies not on file    Review of Systems  Review of Systems    Constitutional: Negative for activity change, appetite change, chills, fatigue, fever and unexpected weight change.   HENT: Negative for congestion, hearing loss, nosebleeds, sinus pressure and trouble swallowing.    Eyes: Negative for pain, discharge and itching.   Respiratory: Positive for cough. Negative for chest tightness and shortness of breath.    Cardiovascular: Negative for chest pain, palpitations and leg swelling.   Gastrointestinal: Negative for abdominal distention, abdominal pain, blood in stool, diarrhea, nausea, rectal pain and vomiting.   Endocrine: Negative for heat intolerance and polydipsia.   Genitourinary: Negative for difficulty urinating, dysuria, flank pain, frequency, hematuria and urgency.   Musculoskeletal: Negative for arthralgias, back pain and neck pain.   Skin: Negative for color change, pallor and rash.   Neurological: Negative for dizziness, numbness and headaches.   Hematological: Negative for adenopathy. Does not bruise/bleed easily.   Psychiatric/Behavioral: Negative for confusion and decreased concentration. The patient is not nervous/anxious.         Objective     Objective:     Vitals:    11/21/19 0819   BP: 109/64   Pulse: 92   Resp: 20   Temp: 98.4 °F (36.9 °C)     Physical Exam   Constitutional: She is oriented to person, place, and time. She appears well-developed and well-nourished. No distress.   HENT:   Head: Normocephalic and atraumatic.   Mouth/Throat: Oropharynx is clear and moist and mucous membranes are normal. No oral lesions.   Eyes: Conjunctivae and EOM are normal. Right eye exhibits no discharge. Left eye exhibits no discharge. No scleral icterus.   Neck: Neck supple.   Cardiovascular: Normal rate, regular rhythm and normal heart sounds.   No murmur heard.  Pulmonary/Chest: Effort normal and breath sounds normal. No respiratory distress. She has no wheezes. She has no rhonchi. She has no rales. Chest wall is not dull to percussion.   Cough with deep  inspiration   Abdominal: Soft. Normal appearance and bowel sounds are normal. There is no tenderness.   Neurological: She is alert and oriented to person, place, and time.   Skin: Skin is warm, dry and intact. No pallor.   Psychiatric: Her behavior is normal. Thought content normal.   Nursing note and vitals reviewed.      Labs and Imaging  Results for orders placed or performed in visit on 11/21/19   CBC Oncology   Result Value Ref Range    WBC 9.05 3.90 - 12.70 K/uL    RBC 2.12 (L) 4.00 - 5.40 M/uL    Hemoglobin 7.1 (L) 12.0 - 16.0 g/dL    Hematocrit 20.1 (L) 37.0 - 48.5 %    Mean Corpuscular Volume 95 82 - 98 fL    Mean Corpuscular Hemoglobin 33.5 (H) 27.0 - 31.0 pg    Mean Corpuscular Hemoglobin Conc 35.3 32.0 - 36.0 g/dL    RDW 29.0 (H) 11.5 - 14.5 %    Platelets 334 150 - 350 K/uL    MPV 10.6 9.2 - 12.9 fL    Gran # (ANC) 4.5 1.8 - 7.7 K/uL    Immature Grans (Abs) 0.06 (H) 0.00 - 0.04 K/uL   Comprehensive metabolic panel   Result Value Ref Range    Sodium 137 136 - 145 mmol/L    Potassium 4.6 3.5 - 5.1 mmol/L    Chloride 107 95 - 110 mmol/L    CO2 22 (L) 23 - 29 mmol/L    Glucose 95 70 - 110 mg/dL    BUN, Bld 5 (L) 6 - 20 mg/dL    Creatinine 0.7 0.5 - 1.4 mg/dL    Calcium 9.2 8.7 - 10.5 mg/dL    Total Protein 8.5 (H) 6.0 - 8.4 g/dL    Albumin 4.2 3.5 - 5.2 g/dL    Total Bilirubin 3.4 (H) 0.1 - 1.0 mg/dL    Alkaline Phosphatase 71 55 - 135 U/L    AST 85 (H) 10 - 40 U/L    ALT 73 (H) 10 - 44 U/L    Anion Gap 8 8 - 16 mmol/L    eGFR if African American >60.0 >60 mL/min/1.73 m^2    eGFR if non African American >60.0 >60 mL/min/1.73 m^2     Labs ordered for today    Assessment   Assessment:     1. 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    - Appears hemoglobin often in 7- 8 range with retic % in mid-teens.    - Continue Hydrea, but increase to the dose that she had been on prior (1000 mg  M-F and 1500 mg on S/S) and folic acid. Using contraception.    - Does not have frequent pain crisis although had crisis 11/2019 with UTI/URI    2. Recent e. Coli UTI.   3. Post-viral cough. Tussinex short term.       Plan:     Continue Hydrea and folic acid.   Echo  Still needs to see Optometry  Vaccines UTD  Patient wants to see Dr Malone in [place] thus we will make that referral and will repeat cbc, cmp on that visit since Hydrea increased (although previously on with good counts).    I asked the patient to call for any questions, concerns, or new symptoms.    I spent 25 minutes with patient and family with 20 spent face to face counseling on diagnosis, goals of therapy with patient's mother.

## 2019-11-21 ENCOUNTER — TELEPHONE (OUTPATIENT)
Dept: HEMATOLOGY/ONCOLOGY | Facility: CLINIC | Age: 24
End: 2019-11-21

## 2019-11-21 ENCOUNTER — LAB VISIT (OUTPATIENT)
Dept: LAB | Facility: HOSPITAL | Age: 24
End: 2019-11-21
Payer: MEDICARE

## 2019-11-21 ENCOUNTER — OFFICE VISIT (OUTPATIENT)
Dept: HEMATOLOGY/ONCOLOGY | Facility: CLINIC | Age: 24
End: 2019-11-21
Payer: COMMERCIAL

## 2019-11-21 VITALS
SYSTOLIC BLOOD PRESSURE: 109 MMHG | WEIGHT: 143.31 LBS | DIASTOLIC BLOOD PRESSURE: 64 MMHG | BODY MASS INDEX: 23.88 KG/M2 | TEMPERATURE: 98 F | RESPIRATION RATE: 20 BRPM | OXYGEN SATURATION: 94 % | HEIGHT: 65 IN | HEART RATE: 92 BPM

## 2019-11-21 DIAGNOSIS — D57.1 HB-SS DISEASE WITHOUT CRISIS: Primary | ICD-10-CM

## 2019-11-21 DIAGNOSIS — R05.9 COUGH: ICD-10-CM

## 2019-11-21 DIAGNOSIS — D57.1 HB-SS DISEASE WITHOUT CRISIS: ICD-10-CM

## 2019-11-21 LAB
ALBUMIN SERPL BCP-MCNC: 4.2 G/DL (ref 3.5–5.2)
ALP SERPL-CCNC: 71 U/L (ref 55–135)
ALT SERPL W/O P-5'-P-CCNC: 73 U/L (ref 10–44)
ANION GAP SERPL CALC-SCNC: 8 MMOL/L (ref 8–16)
AST SERPL-CCNC: 85 U/L (ref 10–40)
BILIRUB SERPL-MCNC: 3.4 MG/DL (ref 0.1–1)
BUN SERPL-MCNC: 5 MG/DL (ref 6–20)
CALCIUM SERPL-MCNC: 9.2 MG/DL (ref 8.7–10.5)
CHLORIDE SERPL-SCNC: 107 MMOL/L (ref 95–110)
CO2 SERPL-SCNC: 22 MMOL/L (ref 23–29)
CREAT SERPL-MCNC: 0.7 MG/DL (ref 0.5–1.4)
ERYTHROCYTE [DISTWIDTH] IN BLOOD BY AUTOMATED COUNT: 29 % (ref 11.5–14.5)
EST. GFR  (AFRICAN AMERICAN): >60 ML/MIN/1.73 M^2
EST. GFR  (NON AFRICAN AMERICAN): >60 ML/MIN/1.73 M^2
GLUCOSE SERPL-MCNC: 95 MG/DL (ref 70–110)
HCT VFR BLD AUTO: 20.1 % (ref 37–48.5)
HGB BLD-MCNC: 7.1 G/DL (ref 12–16)
IMM GRANULOCYTES # BLD AUTO: 0.06 K/UL (ref 0–0.04)
MCH RBC QN AUTO: 33.5 PG (ref 27–31)
MCHC RBC AUTO-ENTMCNC: 35.3 G/DL (ref 32–36)
MCV RBC AUTO: 95 FL (ref 82–98)
NEUTROPHILS # BLD AUTO: 4.5 K/UL (ref 1.8–7.7)
PLATELET # BLD AUTO: 334 K/UL (ref 150–350)
PMV BLD AUTO: 10.6 FL (ref 9.2–12.9)
POTASSIUM SERPL-SCNC: 4.6 MMOL/L (ref 3.5–5.1)
PROT SERPL-MCNC: 8.5 G/DL (ref 6–8.4)
RBC # BLD AUTO: 2.12 M/UL (ref 4–5.4)
SODIUM SERPL-SCNC: 137 MMOL/L (ref 136–145)
WBC # BLD AUTO: 9.05 K/UL (ref 3.9–12.7)

## 2019-11-21 PROCEDURE — 99999 PR PBB SHADOW E&M-EST. PATIENT-LVL III: CPT | Mod: PBBFAC,,, | Performed by: INTERNAL MEDICINE

## 2019-11-21 PROCEDURE — 99214 PR OFFICE/OUTPT VISIT, EST, LEVL IV, 30-39 MIN: ICD-10-PCS | Mod: S$GLB,,, | Performed by: INTERNAL MEDICINE

## 2019-11-21 PROCEDURE — 99999 PR PBB SHADOW E&M-EST. PATIENT-LVL III: ICD-10-PCS | Mod: PBBFAC,,, | Performed by: INTERNAL MEDICINE

## 2019-11-21 PROCEDURE — 85027 COMPLETE CBC AUTOMATED: CPT

## 2019-11-21 PROCEDURE — 3008F BODY MASS INDEX DOCD: CPT | Mod: CPTII,S$GLB,, | Performed by: INTERNAL MEDICINE

## 2019-11-21 PROCEDURE — 80053 COMPREHEN METABOLIC PANEL: CPT

## 2019-11-21 PROCEDURE — 3008F PR BODY MASS INDEX (BMI) DOCUMENTED: ICD-10-PCS | Mod: CPTII,S$GLB,, | Performed by: INTERNAL MEDICINE

## 2019-11-21 PROCEDURE — 99214 OFFICE O/P EST MOD 30 MIN: CPT | Mod: S$GLB,,, | Performed by: INTERNAL MEDICINE

## 2019-11-21 PROCEDURE — 36415 COLL VENOUS BLD VENIPUNCTURE: CPT

## 2019-11-21 RX ORDER — HYDROCODONE POLISTIREX AND CHLORPHENIRAMINE POLISTIREX 10; 8 MG/5ML; MG/5ML
5 SUSPENSION, EXTENDED RELEASE ORAL EVERY 12 HOURS PRN
Qty: 70 ML | Refills: 0 | Status: SHIPPED | OUTPATIENT
Start: 2019-11-21 | End: 2020-02-04 | Stop reason: ALTCHOICE

## 2019-11-21 RX ORDER — CHOLECALCIFEROL (VITAMIN D3) 50 MCG
1 TABLET ORAL
COMMUNITY

## 2019-11-21 RX ORDER — HYDROXYUREA 500 MG/1
1000 CAPSULE ORAL DAILY
Qty: 210 CAPSULE | Refills: 3 | Status: SHIPPED | OUTPATIENT
Start: 2019-11-21 | End: 2020-07-06

## 2019-11-21 NOTE — Clinical Note
[place] [facility] [place] sickle cell disease with cbc, cmp - please schedule between 2-3 weeks.

## 2019-11-21 NOTE — TELEPHONE ENCOUNTER
----- Message from Brooke Valiente MD sent at 11/21/2019  9:06 AM CST -----  Echo and optometry in [place] please  Please refer to [facility] [place] - Dr Malone for sickle cell disease with cbc, cmp - please schedule between 2-3 weeks.

## 2019-11-22 DIAGNOSIS — D57.1 HB-SS DISEASE WITHOUT CRISIS: Primary | ICD-10-CM

## 2021-05-04 ENCOUNTER — PATIENT MESSAGE (OUTPATIENT)
Dept: RESEARCH | Facility: HOSPITAL | Age: 26
End: 2021-05-04

## 2024-07-10 ENCOUNTER — OCCUPATIONAL HEALTH (OUTPATIENT)
Dept: URGENT CARE | Facility: CLINIC | Age: 29
End: 2024-07-10

## 2024-07-10 DIAGNOSIS — Z13.9 ENCOUNTER FOR SCREENING: Primary | ICD-10-CM

## 2024-07-10 LAB
CTP QC/QA: YES
POC 5 PANEL DRUG SCREEN: NEGATIVE

## 2024-07-10 NOTE — PROGRESS NOTES
Patient presents to clinic for a ppd placement. Patient instructed not to irritate area of placement. Patient informed to return to clinic 48 to 72 hours to have ppd read.  Pt also came in for a rapid 5 panel drug screen.

## 2024-07-13 LAB
TB INDURATION - 48 HR READ: 0 MM
TB INDURATION - 72 HR READ: 0 MM
TB SKIN TEST - 48 HR READ: NEGATIVE
TB SKIN TEST - 72 HR READ: NEGATIVE

## 2024-08-07 ENCOUNTER — TELEPHONE (OUTPATIENT)
Dept: HEMATOLOGY/ONCOLOGY | Facility: CLINIC | Age: 29
End: 2024-08-07

## 2025-02-21 ENCOUNTER — OCCUPATIONAL HEALTH (OUTPATIENT)
Dept: URGENT CARE | Facility: CLINIC | Age: 30
End: 2025-02-21

## 2025-02-21 DIAGNOSIS — Z02.83 ENCOUNTER FOR DRUG SCREENING: ICD-10-CM

## 2025-02-21 DIAGNOSIS — Z00.00 ENCOUNTER FOR PHYSICAL EXAMINATION: Primary | ICD-10-CM

## 2025-02-21 LAB
CTP QC/QA: YES
POC 5 PANEL DRUG SCREEN: NEGATIVE

## 2025-02-21 NOTE — PROGRESS NOTES
Subjective:      Patient ID: Diamond Uribe is a 29 y.o. female.    Vitals:  vitals were not taken for this visit.     Chief Complaint: Drug / Alcohol Assessment and Employment Physical    Pt presented in clinic for Pre-employment physical and rapid 5 drug screen. [facility] verified by mb. Pt reports no family history of SCD, no allergies. Past Sx Hx include cholecystomy. PMH Hx +sickle cell disease. Denies injury, pain, complaints today in clinic. Doing well overall.     Drug / Alcohol Assessment  Pertinent negatives include no fever.       Constitution: Negative for fever.   Cardiovascular:  Negative for chest pain.   Respiratory:  Negative for shortness of breath.       Objective:     Physical Exam   Constitutional:  Non-toxic appearance. She does not appear ill. No distress.   HENT:   Head: Normocephalic and atraumatic.   Ears:   Right Ear: Tympanic membrane, external ear and ear canal normal.   Left Ear: Tympanic membrane, external ear and ear canal normal.   Nose: No rhinorrhea or congestion.   Mouth/Throat: Uvula is midline and oropharynx is clear and moist. Mucous membranes are moist. No posterior oropharyngeal edema or posterior oropharyngeal erythema. Oropharynx is clear.   Eyes: Conjunctivae are normal.   Neck: Neck supple. No neck rigidity present.   Cardiovascular: Normal rate and regular rhythm.   Pulmonary/Chest: Effort normal. No respiratory distress.   Abdominal: Normal appearance.   Neurological: no focal deficit. She is alert.   Skin: Skin is warm, dry and not diaphoretic.   Psychiatric: Her behavior is normal. Judgment and thought content normal.   Nursing note and vitals reviewed.      Assessment:     1. Encounter for drug screening    2. Encounter for physical examination        Plan:       Encounter for drug screening  -     POCT Rapid Drug Screen 5 Panel    Encounter for physical examination    Please see attached form. Discussed with patient the importance of f/u with their  primary care provider. Urged to go to the ER for any worsening signs or symptoms.